# Patient Record
Sex: MALE | Race: ASIAN | NOT HISPANIC OR LATINO
[De-identification: names, ages, dates, MRNs, and addresses within clinical notes are randomized per-mention and may not be internally consistent; named-entity substitution may affect disease eponyms.]

---

## 2022-04-25 ENCOUNTER — APPOINTMENT (OUTPATIENT)
Dept: UROLOGY | Facility: CLINIC | Age: 52
End: 2022-04-25
Payer: COMMERCIAL

## 2022-04-25 VITALS
RESPIRATION RATE: 16 BRPM | DIASTOLIC BLOOD PRESSURE: 84 MMHG | HEIGHT: 67 IN | HEART RATE: 71 BPM | TEMPERATURE: 97.8 F | SYSTOLIC BLOOD PRESSURE: 129 MMHG | BODY MASS INDEX: 28.09 KG/M2 | OXYGEN SATURATION: 99 % | WEIGHT: 179 LBS

## 2022-04-25 DIAGNOSIS — Z87.438 PERSONAL HISTORY OF OTHER DISEASES OF MALE GENITAL ORGANS: ICD-10-CM

## 2022-04-25 DIAGNOSIS — Z78.9 OTHER SPECIFIED HEALTH STATUS: ICD-10-CM

## 2022-04-25 PROBLEM — Z00.00 ENCOUNTER FOR PREVENTIVE HEALTH EXAMINATION: Status: ACTIVE | Noted: 2022-04-25

## 2022-04-25 PROCEDURE — 99204 OFFICE O/P NEW MOD 45 MIN: CPT

## 2022-04-25 PROCEDURE — 51798 US URINE CAPACITY MEASURE: CPT

## 2022-04-25 RX ORDER — POLYETHYLENE GLYCOL-3350 AND ELECTROLYTES 236; 6.74; 5.86; 2.97; 22.74 G/274.31G; G/274.31G; G/274.31G; G/274.31G; G/274.31G
236 POWDER, FOR SOLUTION ORAL
Qty: 4000 | Refills: 0 | Status: ACTIVE | COMMUNITY
Start: 2022-02-05

## 2022-04-25 RX ORDER — FLUTICASONE PROPIONATE 50 UG/1
50 SPRAY, METERED NASAL
Qty: 16 | Refills: 0 | Status: ACTIVE | COMMUNITY
Start: 2022-01-11

## 2022-04-25 RX ORDER — OXYCODONE 5 MG/1
5 TABLET ORAL
Qty: 1 | Refills: 0 | Status: ACTIVE | COMMUNITY
Start: 2022-02-19

## 2022-04-25 RX ORDER — SODIUM FLUORIDE 5 MG/G
1.1 PASTE, DENTIFRICE ORAL
Qty: 339 | Refills: 0 | Status: ACTIVE | COMMUNITY
Start: 2021-11-21

## 2022-04-25 RX ORDER — ALPRAZOLAM 0.5 MG/1
0.5 TABLET ORAL
Qty: 1 | Refills: 0 | Status: ACTIVE | COMMUNITY
Start: 2022-02-19

## 2022-04-25 RX ORDER — AZELASTINE HYDROCHLORIDE 137 UG/1
0.1 SPRAY, METERED NASAL
Qty: 30 | Refills: 0 | Status: ACTIVE | COMMUNITY
Start: 2022-02-05

## 2022-04-25 NOTE — ASSESSMENT
[FreeTextEntry1] : Patient is a 52 yo M who presents for urinary frequency, urgency and microhematuria.\par \par For microhematuria will perform hematuria work up with renal ULT and cysto\par For LUTS - Will give trial of flomax for symptoms. Counseled on proper use and SE profile, including LH, retrograde ejaculation.\par Urine testing\par PVR\par F/u cysto \par

## 2022-04-25 NOTE — PHYSICAL EXAM
[General Appearance - Well Developed] : well developed [General Appearance - Well Nourished] : well nourished [Normal Appearance] : normal appearance [Well Groomed] : well groomed [General Appearance - In No Acute Distress] : no acute distress [Edema] : no peripheral edema [Respiration, Rhythm And Depth] : normal respiratory rhythm and effort [Exaggerated Use Of Accessory Muscles For Inspiration] : no accessory muscle use [Abdomen Soft] : soft [Abdomen Tenderness] : non-tender [Costovertebral Angle Tenderness] : no ~M costovertebral angle tenderness [Urethral Meatus] : meatus normal [Urinary Bladder Findings] : the bladder was normal on palpation [Scrotum] : the scrotum was normal [Testes Mass (___cm)] : there were no testicular masses [No Prostate Nodules] : no prostate nodules [Normal Station and Gait] : the gait and station were normal for the patient's age [] : no rash [No Focal Deficits] : no focal deficits [Oriented To Time, Place, And Person] : oriented to person, place, and time [Affect] : the affect was normal [Mood] : the mood was normal [Not Anxious] : not anxious [Testes Tenderness] : no tenderness of the testes [Prostate Tenderness] : the prostate was not tender [Prostate Size ___ gm] : prostate size [unfilled] gm

## 2022-04-25 NOTE — HISTORY OF PRESENT ILLNESS
[FreeTextEntry1] : Patient is a 52 yo M who presents for many years of urinary frequency, urgency and occasional urge incontinence and nocturia.  Rarely he will have a mild burning sensation and will see a whitish discharge from penis.  No hematuria.\par \par He reports many years of symptoms.  He states that 10 yrs ago he was told he had prostatitis and was given injection in [place] ~20 yrs ago.  He cannot recall taking any other prostate medication.\par \par He states he does eat spicy foods at times.\par \par He states he had recent abd ULT showing stones.  \par \par Recent PSA 0.72 in 1/2022, UA then showed 5-10 rbcs/hpf.\par  [Urinary Retention] : no urinary retention [Weak Stream] : no weak stream [Dysuria] : no dysuria [Hematuria - Gross] : no gross hematuria

## 2022-04-29 ENCOUNTER — APPOINTMENT (OUTPATIENT)
Age: 52
End: 2022-04-29

## 2022-04-29 LAB
APPEARANCE: CLEAR
BACTERIA: NEGATIVE
BILIRUBIN URINE: NEGATIVE
BLOOD URINE: NEGATIVE
C TRACH RRNA SPEC QL NAA+PROBE: NOT DETECTED
COLOR: YELLOW
GLUCOSE QUALITATIVE U: NEGATIVE
HYALINE CASTS: 0 /LPF
KETONES URINE: NEGATIVE
LEUKOCYTE ESTERASE URINE: NEGATIVE
MICROSCOPIC-UA: NORMAL
N GONORRHOEA RRNA SPEC QL NAA+PROBE: NOT DETECTED
NITRITE URINE: NEGATIVE
PH URINE: 6
PROTEIN URINE: ABNORMAL
RED BLOOD CELLS URINE: 3 /HPF
SOURCE AMPLIFICATION: NORMAL
SPECIFIC GRAVITY URINE: 1.02
SQUAMOUS EPITHELIAL CELLS: 0 /HPF
UROBILINOGEN URINE: NORMAL
WHITE BLOOD CELLS URINE: 1 /HPF

## 2022-05-09 ENCOUNTER — APPOINTMENT (OUTPATIENT)
Dept: UROLOGY | Facility: CLINIC | Age: 52
End: 2022-05-09
Payer: COMMERCIAL

## 2022-05-09 VITALS
OXYGEN SATURATION: 95 % | WEIGHT: 175 LBS | SYSTOLIC BLOOD PRESSURE: 132 MMHG | RESPIRATION RATE: 16 BRPM | BODY MASS INDEX: 27.41 KG/M2 | TEMPERATURE: 97 F | HEART RATE: 68 BPM | DIASTOLIC BLOOD PRESSURE: 84 MMHG

## 2022-05-09 DIAGNOSIS — R39.15 URGENCY OF URINATION: ICD-10-CM

## 2022-05-09 PROCEDURE — 52000 CYSTOURETHROSCOPY: CPT

## 2022-05-09 PROCEDURE — 76775 US EXAM ABDO BACK WALL LIM: CPT

## 2022-11-07 ENCOUNTER — APPOINTMENT (OUTPATIENT)
Dept: UROLOGY | Facility: CLINIC | Age: 52
End: 2022-11-07

## 2022-11-07 PROCEDURE — 99213 OFFICE O/P EST LOW 20 MIN: CPT

## 2022-11-07 RX ORDER — TAMSULOSIN HYDROCHLORIDE 0.4 MG/1
0.4 CAPSULE ORAL
Qty: 90 | Refills: 1 | Status: ACTIVE | COMMUNITY
Start: 2022-04-25 | End: 1900-01-01

## 2022-11-07 NOTE — HISTORY OF PRESENT ILLNESS
[FreeTextEntry1] : Patient is a 51 yo M who presents for many years of urinary frequency, urgency and occasional urge incontinence.  Rarely he will have a mild burning sensation and will see a whitish discharge from penis.  No hematuria.\par \par He reports many years of symptoms.  He states that 10 yrs ago he was told he had prostatitis and was given injection in [place] ~20 yrs ago.  He cannot recall taking any other prostate medication.\par \par He states he had ULT showing stones.  \par \par Recent PSA 0.72 in 1/2022, UA then showed 5-10 rbcs/hpf.\par \par Interval hx:\par Cystoscopy, renal ULT in 5/2022 - unremarkable except for mild BPH and small L intrarenal stone.\par He had some improvement on flomax.  He did run out of flomax.  He is still having some frequency, urgency.  \par \par Renal ULT today stable, questionable punctate R stone and stable small L stone.  No hydro or masses.

## 2022-11-07 NOTE — ASSESSMENT
[FreeTextEntry1] : Patient is a 51 yo M who presents for urinary frequency, urgency and microhematuria.\par S/p negative cystoscopy, renal ULT with small intrarenal stone.\par \par Renal ULT today essentially stable\par Will repeat UA\par Cont flomax as pt felt there was improvement in frequency\par D/w pt that would continue observation for small stones, counseled on stone diet recs\par F/u 6 mos for repeat renal ULT\par

## 2022-11-14 ENCOUNTER — APPOINTMENT (OUTPATIENT)
Age: 52
End: 2022-11-14

## 2022-11-14 LAB
APPEARANCE: CLEAR
BACTERIA: NEGATIVE
BILIRUBIN URINE: NEGATIVE
BLOOD URINE: NEGATIVE
COLOR: YELLOW
GLUCOSE QUALITATIVE U: NEGATIVE
HYALINE CASTS: 1 /LPF
KETONES URINE: NEGATIVE
LEUKOCYTE ESTERASE URINE: NEGATIVE
MICROSCOPIC-UA: NORMAL
NITRITE URINE: NEGATIVE
PH URINE: 6.5
PROTEIN URINE: ABNORMAL
RED BLOOD CELLS URINE: 2 /HPF
SPECIFIC GRAVITY URINE: 1.02
SQUAMOUS EPITHELIAL CELLS: 0 /HPF
UROBILINOGEN URINE: NORMAL
WHITE BLOOD CELLS URINE: 1 /HPF

## 2023-05-08 ENCOUNTER — APPOINTMENT (OUTPATIENT)
Dept: UROLOGY | Facility: CLINIC | Age: 53
End: 2023-05-08
Payer: COMMERCIAL

## 2023-05-08 DIAGNOSIS — N20.0 CALCULUS OF KIDNEY: ICD-10-CM

## 2023-05-08 DIAGNOSIS — R35.0 FREQUENCY OF MICTURITION: ICD-10-CM

## 2023-05-08 DIAGNOSIS — R31.29 OTHER MICROSCOPIC HEMATURIA: ICD-10-CM

## 2023-05-08 PROCEDURE — 76775 US EXAM ABDO BACK WALL LIM: CPT

## 2023-05-08 PROCEDURE — 99212 OFFICE O/P EST SF 10 MIN: CPT

## 2023-05-08 NOTE — PHYSICAL EXAM
[Normal Appearance] : normal appearance [Abdomen Soft] : soft [Abdomen Tenderness] : non-tender [] : no respiratory distress [Exaggerated Use Of Accessory Muscles For Inspiration] : no accessory muscle use [Oriented To Time, Place, And Person] : oriented to person, place, and time [Normal Station and Gait] : the gait and station were normal for the patient's age [No Focal Deficits] : no focal deficits

## 2023-05-15 NOTE — ASSESSMENT
[FreeTextEntry1] : Patient is a 53 yo M who presents for urinary frequency, urgency and microhematuria.\par S/p negative cystoscopy, renal ULT with small intrarenal stone.\par \par Renal ULT today essentially stable- small intrarenal stone\par D/w pt that would continue observation for small stones, counseled on stone diet recs\par He has not improved significantly with flomax for his LUTS/frequency - therefore will cont observation off meds.\par Last UA 11/2022 no microhematuria, will plan repeat UA in 6 mos at 1 yr interval\par F/u 6 mos for repeat renal ULT, will check UA then

## 2023-05-15 NOTE — HISTORY OF PRESENT ILLNESS
[FreeTextEntry1] : Patient is a 53 yo M who presents for many years of urinary frequency, urgency and occasional urge incontinence. Rarely he will have a mild burning sensation and will see a whitish discharge from penis. No hematuria.\par \par He reports many years of symptoms. He states that 10 yrs ago he was told he had prostatitis and was given injection in [place] ~20 yrs ago. He cannot recall taking any other prostate medication.\par \par He states he had ULT showing stones. \par \par Recent PSA 0.72 in 1/2022, UA then showed 5-10 rbcs/hpf.\par \par Interval hx:\par Cystoscopy, renal ULT in 5/2022 - unremarkable except for mild BPH and small L intrarenal stone.\par He had some improvement on flomax. He did run out of flomax. He is still having some frequency, urgency. \par \par Renal ULT today stable, questionable punctate R stone and stable small L stone. No hydro or masses. \par \par 5/8/2023\par \par Patient returns today for follow up. He stopped Flomax several months ago, LUTS remains same, no significant difference noted. Overall he is fine with his urination and feels not change on flomax.  He feels occ incomplete empty sensation, frequency, urgency post void dribbling, nocturia x 0-1. He did not pass any stone, and has no gross hematuria, dysuria or abdominal/flank pain. \par \par

## 2023-11-06 ENCOUNTER — APPOINTMENT (OUTPATIENT)
Dept: UROLOGY | Facility: CLINIC | Age: 53
End: 2023-11-06